# Patient Record
Sex: MALE | Race: OTHER | Employment: FULL TIME | URBAN - METROPOLITAN AREA
[De-identification: names, ages, dates, MRNs, and addresses within clinical notes are randomized per-mention and may not be internally consistent; named-entity substitution may affect disease eponyms.]

---

## 2017-02-03 RX ORDER — TRAZODONE HYDROCHLORIDE 50 MG/1
TABLET ORAL
Qty: 90 TABLET | Refills: 0 | Status: SHIPPED | OUTPATIENT
Start: 2017-02-03 | End: 2018-02-03

## 2017-05-15 RX ORDER — TRAZODONE HYDROCHLORIDE 50 MG/1
TABLET ORAL
Qty: 90 TABLET | Refills: 0 | OUTPATIENT
Start: 2017-05-15

## 2017-05-15 NOTE — TELEPHONE ENCOUNTER
JESSIE- Please call pt and schedule fup appt  Per Dr. Komal Guerrero for additional refill on meds. Thanks!

## 2017-08-31 NOTE — PROGRESS NOTES
HPI:    Patient ID: Humphrey Glass is a 31year old male. Back Pain   This is a new problem. The current episode started more than 1 month ago. The problem occurs constantly. The problem has been waxing and waning since onset.  The pain is present in the Body mass index is 22.24 kg/m². ASSESSMENT/PLAN:   (F51.01) Primary insomnia  (primary encounter diagnosis)  - Patient presents to the clinic for sleeping problem that is improved by TRAZODONE HCL 50 MG  - On exam, patient unremarkable.

## 2018-02-03 ENCOUNTER — TELEPHONE (OUTPATIENT)
Dept: INTERNAL MEDICINE CLINIC | Facility: CLINIC | Age: 38
End: 2018-02-03

## 2018-02-03 RX ORDER — TRAZODONE HYDROCHLORIDE 50 MG/1
TABLET ORAL
Qty: 90 TABLET | Refills: 0 | Status: SHIPPED | OUTPATIENT
Start: 2018-02-03 | End: 2018-08-17

## 2018-02-03 NOTE — TELEPHONE ENCOUNTER
Rx reordered and sent to [facility] instead of [facility] as [facility] sent earlier today.

## 2018-02-03 NOTE — TELEPHONE ENCOUNTER
Per pt he has a change in insurance and would like to know if this medication can be changed to a 3 mo supply. He only got 1 mo.  pls advise, pharmacy is also updated to [facility]. Thank you      Current Outpatient Prescriptions:  TraZODone HCl 50 MG Oral Tab Patty Earl

## 2018-05-25 RX ORDER — TRAZODONE HYDROCHLORIDE 50 MG/1
TABLET ORAL
Qty: 90 TABLET | Refills: 0 | OUTPATIENT
Start: 2018-05-25

## 2018-08-17 ENCOUNTER — TELEPHONE (OUTPATIENT)
Dept: INTERNAL MEDICINE CLINIC | Facility: CLINIC | Age: 38
End: 2018-08-17

## 2018-08-17 RX ORDER — TRAZODONE HYDROCHLORIDE 50 MG/1
TABLET ORAL
Qty: 30 TABLET | Refills: 0 | Status: SHIPPED | OUTPATIENT
Start: 2018-08-17

## 2018-08-17 RX ORDER — TRAZODONE HYDROCHLORIDE 50 MG/1
TABLET ORAL
Qty: 90 TABLET | Refills: 0 | OUTPATIENT
Start: 2018-08-17

## 2018-08-17 NOTE — TELEPHONE ENCOUNTER
Please advise. Last refilled on 2/3/18   Last office visit on 8/31/17. Call center please call and schedule an appointment. Thank You. Yudy Jacobsen

## 2018-08-17 NOTE — TELEPHONE ENCOUNTER
I can only give him 30 day refill since I only covering for Dr Juana Kirkland and have not seen this patient before so we can call in to his local pharmacy the 30 day refill.

## 2018-08-17 NOTE — TELEPHONE ENCOUNTER
Current Outpatient Prescriptions:       •  TraZODone HCl 50 MG Oral Tab, TAKE 1 TABLET BY MOUTH NIGHTLY, Disp: 90 tablet, Rfl: 0      refill

## 2018-09-13 RX ORDER — TRAZODONE HYDROCHLORIDE 50 MG/1
TABLET ORAL
Qty: 90 TABLET | Refills: 0 | Status: SHIPPED | OUTPATIENT
Start: 2018-09-13